# Patient Record
Sex: MALE | URBAN - NONMETROPOLITAN AREA
[De-identification: names, ages, dates, MRNs, and addresses within clinical notes are randomized per-mention and may not be internally consistent; named-entity substitution may affect disease eponyms.]

---

## 2023-01-30 ENCOUNTER — OFFICE VISIT (OUTPATIENT)
Dept: URBAN - NONMETROPOLITAN AREA CLINIC 7 | Facility: CLINIC | Age: 73
End: 2023-01-30
Payer: MEDICARE

## 2023-01-30 DIAGNOSIS — H25.813 COMBINED FORMS OF AGE-RELATED CATARACT, BILATERAL: Primary | ICD-10-CM

## 2023-01-30 PROCEDURE — 92004 COMPRE OPH EXAM NEW PT 1/>: CPT | Performed by: OPTOMETRIST

## 2023-01-30 ASSESSMENT — KERATOMETRY
OD: 44.62
OS: 43.89

## 2023-01-30 ASSESSMENT — INTRAOCULAR PRESSURE
OD: 15
OS: 14

## 2023-01-30 ASSESSMENT — VISUAL ACUITY
OD: 20/30
OS: 20/30

## 2023-01-30 NOTE — IMPRESSION/PLAN
Impression: Combined forms of age-related cataract, bilateral: H25.813. Plan: Cataracts account for the patient's complaints. Discussed all risks, benefits, procedures and recovery. Patient understands changing glasses will not improve vision. Patient does not desire to have surgery at this time. Will continue to monitor, but pt can RTC if symptoms worsen, and will continue cataract consult at that time.

## 2024-03-22 ENCOUNTER — OFFICE VISIT (OUTPATIENT)
Dept: URBAN - NONMETROPOLITAN AREA CLINIC 7 | Facility: CLINIC | Age: 74
End: 2024-03-22
Payer: MEDICARE

## 2024-03-22 DIAGNOSIS — H52.4 PRESBYOPIA: ICD-10-CM

## 2024-03-22 DIAGNOSIS — H04.123 TEAR FILM INSUFFICIENCY OF BILATERAL LACRIMAL GLANDS: ICD-10-CM

## 2024-03-22 DIAGNOSIS — H43.812 VITREOUS DEGENERATION, LEFT EYE: ICD-10-CM

## 2024-03-22 DIAGNOSIS — D23.122 OTHER BENIGN NEOPLASM SKIN/ LEFT LOWER EYELID, INC CANTHUS: ICD-10-CM

## 2024-03-22 DIAGNOSIS — H25.813 COMBINED FORMS OF AGE-RELATED CATARACT, BILATERAL: Primary | ICD-10-CM

## 2024-03-22 PROCEDURE — 92285 EXTERNAL OCULAR PHOTOGRAPHY: CPT | Performed by: OPTOMETRIST

## 2024-03-22 PROCEDURE — 92134 CPTRZ OPH DX IMG PST SGM RTA: CPT | Performed by: OPTOMETRIST

## 2024-03-22 PROCEDURE — 92014 COMPRE OPH EXAM EST PT 1/>: CPT | Performed by: OPTOMETRIST

## 2024-03-22 ASSESSMENT — INTRAOCULAR PRESSURE
OS: 14
OD: 14

## 2024-03-22 ASSESSMENT — VISUAL ACUITY
OS: 20/70
OD: 20/40